# Patient Record
Sex: MALE | Race: WHITE | Employment: FULL TIME | URBAN - METROPOLITAN AREA
[De-identification: names, ages, dates, MRNs, and addresses within clinical notes are randomized per-mention and may not be internally consistent; named-entity substitution may affect disease eponyms.]

---

## 2017-08-04 ENCOUNTER — OFFICE VISIT (OUTPATIENT)
Dept: FAMILY MEDICINE CLINIC | Age: 57
End: 2017-08-04
Payer: COMMERCIAL

## 2017-08-04 VITALS
HEIGHT: 69 IN | HEART RATE: 77 BPM | DIASTOLIC BLOOD PRESSURE: 80 MMHG | WEIGHT: 179.2 LBS | SYSTOLIC BLOOD PRESSURE: 152 MMHG | BODY MASS INDEX: 26.54 KG/M2 | OXYGEN SATURATION: 97 %

## 2017-08-04 DIAGNOSIS — Z12.11 SCREEN FOR COLON CANCER: ICD-10-CM

## 2017-08-04 DIAGNOSIS — K42.9 UMBILICAL HERNIA WITHOUT OBSTRUCTION AND WITHOUT GANGRENE: ICD-10-CM

## 2017-08-04 DIAGNOSIS — Z51.81 MEDICATION MONITORING ENCOUNTER: ICD-10-CM

## 2017-08-04 DIAGNOSIS — Z12.5 SCREENING FOR PROSTATE CANCER: ICD-10-CM

## 2017-08-04 DIAGNOSIS — I83.892 VARICOSE VEINS OF LEFT LEG WITH EDEMA: ICD-10-CM

## 2017-08-04 DIAGNOSIS — Z00.01 ENCOUNTER FOR GENERAL ADULT MEDICAL EXAMINATION WITH ABNORMAL FINDINGS: Primary | ICD-10-CM

## 2017-08-04 DIAGNOSIS — F17.200 SMOKING: ICD-10-CM

## 2017-08-04 DIAGNOSIS — R03.0 ELEVATED BLOOD PRESSURE READING: ICD-10-CM

## 2017-08-04 DIAGNOSIS — Z13.220 SCREENING FOR LIPID DISORDERS: ICD-10-CM

## 2017-08-04 PROCEDURE — 99204 OFFICE O/P NEW MOD 45 MIN: CPT | Performed by: FAMILY MEDICINE

## 2017-08-04 ASSESSMENT — PATIENT HEALTH QUESTIONNAIRE - PHQ9
1. LITTLE INTEREST OR PLEASURE IN DOING THINGS: 0
SUM OF ALL RESPONSES TO PHQ QUESTIONS 1-9: 0
SUM OF ALL RESPONSES TO PHQ9 QUESTIONS 1 & 2: 0
2. FEELING DOWN, DEPRESSED OR HOPELESS: 0

## 2017-08-04 ASSESSMENT — ENCOUNTER SYMPTOMS
COUGH: 0
COLOR CHANGE: 0
EYE PAIN: 0
SINUS PRESSURE: 0
CHOKING: 0
DIARRHEA: 0
ABDOMINAL DISTENTION: 0
APNEA: 0
SHORTNESS OF BREATH: 0
EYE REDNESS: 0
WHEEZING: 0
ABDOMINAL PAIN: 0
EYE DISCHARGE: 0
STRIDOR: 0
EYE ITCHING: 0
RHINORRHEA: 0
NAUSEA: 0
CONSTIPATION: 0
VOMITING: 0
BACK PAIN: 0
CHEST TIGHTNESS: 0
SORE THROAT: 0
BLOOD IN STOOL: 0
PHOTOPHOBIA: 0

## 2017-08-22 LAB
ALBUMIN SERPL-MCNC: 4.6 G/DL
ALP BLD-CCNC: 100 U/L
ALT SERPL-CCNC: 17 U/L
ANION GAP SERPL CALCULATED.3IONS-SCNC: 12 MMOL/L
AST SERPL-CCNC: 18 U/L
BILIRUB SERPL-MCNC: 1.2 MG/DL (ref 0.1–1.4)
BUN BLDV-MCNC: 15 MG/DL
CALCIUM SERPL-MCNC: 9.1 MG/DL
CHLORIDE BLD-SCNC: 104 MMOL/L
CHOLESTEROL, TOTAL: 193 MG/DL
CHOLESTEROL/HDL RATIO: 3.8
CO2: 26 MMOL/L
CREAT SERPL-MCNC: 0.86 MG/DL
GFR CALCULATED: >60
GLUCOSE BLD-MCNC: 87 MG/DL
HDLC SERPL-MCNC: 51 MG/DL (ref 35–70)
LDL CHOLESTEROL CALCULATED: 106 MG/DL (ref 0–160)
POTASSIUM SERPL-SCNC: 4.2 MMOL/L
PROSTATE SPECIFIC ANTIGEN: 0.77 NG/ML
SODIUM BLD-SCNC: 142 MMOL/L
TOTAL PROTEIN: 7.5
TRIGL SERPL-MCNC: 178 MG/DL
VLDLC SERPL CALC-MCNC: 36 MG/DL

## 2017-08-24 DIAGNOSIS — Z00.01 ENCOUNTER FOR GENERAL ADULT MEDICAL EXAMINATION WITH ABNORMAL FINDINGS: ICD-10-CM

## 2017-08-24 DIAGNOSIS — Z51.81 MEDICATION MONITORING ENCOUNTER: ICD-10-CM

## 2017-08-24 DIAGNOSIS — Z12.5 SCREENING FOR PROSTATE CANCER: ICD-10-CM

## 2017-08-24 DIAGNOSIS — Z13.220 SCREENING FOR LIPID DISORDERS: ICD-10-CM

## 2017-09-11 DIAGNOSIS — Z12.11 SCREEN FOR COLON CANCER: ICD-10-CM

## 2017-09-11 DIAGNOSIS — Z00.01 ENCOUNTER FOR GENERAL ADULT MEDICAL EXAMINATION WITH ABNORMAL FINDINGS: ICD-10-CM

## 2017-09-11 LAB
CONTROL: PRESENT
HEMOCCULT STL QL: ABNORMAL

## 2017-09-11 PROCEDURE — 82274 ASSAY TEST FOR BLOOD FECAL: CPT | Performed by: FAMILY MEDICINE

## 2017-11-10 DIAGNOSIS — R19.5 POSITIVE FIT (FECAL IMMUNOCHEMICAL TEST): Primary | ICD-10-CM

## 2018-03-16 ENCOUNTER — OFFICE VISIT (OUTPATIENT)
Dept: FAMILY MEDICINE CLINIC | Age: 58
End: 2018-03-16
Payer: COMMERCIAL

## 2018-03-16 VITALS
OXYGEN SATURATION: 97 % | SYSTOLIC BLOOD PRESSURE: 124 MMHG | HEIGHT: 69 IN | BODY MASS INDEX: 26.35 KG/M2 | DIASTOLIC BLOOD PRESSURE: 78 MMHG | WEIGHT: 177.9 LBS | HEART RATE: 98 BPM

## 2018-03-16 DIAGNOSIS — N40.2 PROSTATE NODULE: Primary | ICD-10-CM

## 2018-03-16 PROCEDURE — 99214 OFFICE O/P EST MOD 30 MIN: CPT | Performed by: FAMILY MEDICINE

## 2018-03-16 RX ORDER — TERBINAFINE HYDROCHLORIDE 250 MG/1
250 TABLET ORAL DAILY
Qty: 30 TABLET | Refills: 2 | Status: SHIPPED | OUTPATIENT
Start: 2018-03-16 | End: 2018-07-31 | Stop reason: SDUPTHER

## 2018-03-16 ASSESSMENT — ENCOUNTER SYMPTOMS
VOMITING: 0
ABDOMINAL PAIN: 0
COLOR CHANGE: 0
CHOKING: 0
APNEA: 0
SORE THROAT: 0
CONSTIPATION: 0
RHINORRHEA: 0
NAUSEA: 0
EYE DISCHARGE: 0
SHORTNESS OF BREATH: 0
EYE ITCHING: 0
BACK PAIN: 0
SINUS PRESSURE: 0
ABDOMINAL DISTENTION: 0
EYE REDNESS: 0
WHEEZING: 0
CHEST TIGHTNESS: 0
COUGH: 0
BLOOD IN STOOL: 0
EYE PAIN: 0
STRIDOR: 0
DIARRHEA: 0
PHOTOPHOBIA: 0

## 2018-03-16 NOTE — PROGRESS NOTES
Subjective:      Patient ID: Rasta Sylvester is a 60 y.o. male. Visit Information    Have you changed or started any medications since your last visit including any over-the-counter medicines, vitamins, or herbal medicines? no   Are you having any side effects from any of your medications? -  no  Have you stopped taking any of your medications? Is so, why? -  no    Have you seen any other physician or provider since your last visit? No  Have you had any other diagnostic tests since your last visit? No  Have you been seen in the emergency room and/or had an admission to a hospital since we last saw you? No  Have you had your routine dental cleaning in the past 6 months? yes -     Have you activated your [employer] account? If not, what are your barriers?  No:      Patient Care Team:  Arianna Osborn MD as PCP - General (Family Medicine)    Medical History Review  Past Medical, Family, and Social History reviewed and  contribute to the patient presenting condition    Health Maintenance   Topic Date Due    Hepatitis C screen  1960    HIV screen  01/17/1974    DTaP/Tdap/Td vaccine (1 - Tdap) 01/17/1978    Pneumococcal med risk (1 of 1 - PPSV23) 01/17/1978    Shingles Vaccine (1 of 2 - 2 Dose Series) 01/17/2009    Smoker: low dose lung CT screening  01/17/2014    Flu vaccine (1) 09/01/2017    Colon Cancer Screen FIT/FOBT  09/09/2018    Lipid screen  08/22/2022       HPI    Review of Systems    Objective:   Physical Exam    Assessment / Plan:

## 2018-04-04 DIAGNOSIS — N40.2 PROSTATE NODULE: ICD-10-CM

## 2018-07-12 ENCOUNTER — OFFICE VISIT (OUTPATIENT)
Dept: FAMILY MEDICINE CLINIC | Age: 58
End: 2018-07-12
Payer: COMMERCIAL

## 2018-07-12 VITALS
TEMPERATURE: 98 F | WEIGHT: 178 LBS | SYSTOLIC BLOOD PRESSURE: 130 MMHG | RESPIRATION RATE: 16 BRPM | OXYGEN SATURATION: 95 % | HEART RATE: 92 BPM | BODY MASS INDEX: 26.29 KG/M2 | DIASTOLIC BLOOD PRESSURE: 62 MMHG

## 2018-07-12 DIAGNOSIS — Z23 ENCOUNTER FOR VACCINATION: ICD-10-CM

## 2018-07-12 DIAGNOSIS — Z09 HOSPITAL DISCHARGE FOLLOW-UP: ICD-10-CM

## 2018-07-12 DIAGNOSIS — L03.116 CELLULITIS OF LEFT LOWER EXTREMITY: Primary | ICD-10-CM

## 2018-07-12 PROCEDURE — 99214 OFFICE O/P EST MOD 30 MIN: CPT | Performed by: NURSE PRACTITIONER

## 2018-07-12 PROCEDURE — 90715 TDAP VACCINE 7 YRS/> IM: CPT | Performed by: NURSE PRACTITIONER

## 2018-07-12 PROCEDURE — 90471 IMMUNIZATION ADMIN: CPT | Performed by: NURSE PRACTITIONER

## 2018-07-12 PROCEDURE — 90472 IMMUNIZATION ADMIN EACH ADD: CPT | Performed by: NURSE PRACTITIONER

## 2018-07-12 PROCEDURE — 90732 PPSV23 VACC 2 YRS+ SUBQ/IM: CPT | Performed by: NURSE PRACTITIONER

## 2018-07-12 RX ORDER — CEPHALEXIN 500 MG/1
500 CAPSULE ORAL
COMMUNITY
Start: 2018-07-08 | End: 2018-07-18

## 2018-07-12 RX ORDER — SULFAMETHOXAZOLE AND TRIMETHOPRIM 800; 160 MG/1; MG/1
1 TABLET ORAL
COMMUNITY
Start: 2018-07-08 | End: 2018-07-15

## 2018-07-12 ASSESSMENT — ENCOUNTER SYMPTOMS
VOMITING: 0
ALLERGIC/IMMUNOLOGIC NEGATIVE: 1
DIARRHEA: 0
EYES NEGATIVE: 1
COLOR CHANGE: 1
RESPIRATORY NEGATIVE: 1
GASTROINTESTINAL NEGATIVE: 1
NAUSEA: 0

## 2018-07-12 NOTE — PROGRESS NOTES
Smoking status: Current Every Day Smoker     Packs/day: 1.50     Years: 30.00     Types: Cigarettes    Smokeless tobacco: Never Used    Alcohol use Yes      Current Outpatient Prescriptions   Medication Sig Dispense Refill    cephALEXin (KEFLEX) 500 MG capsule Take 500 mg by mouth      sulfamethoxazole-trimethoprim (BACTRIM DS;SEPTRA DS) 800-160 MG per tablet Take 1 tablet by mouth       No current facility-administered medications for this visit. No Known Allergies    HPI:     HPI     Presents today for hospital follow-up. Was at [facility] ER on Sunday and dx with LLE cellulitis. Lab work for DVT (d-dimer) and CBC (no white count)/BMP was all WNL. Was placed on Bactrim and Keflex s/p visit. Pt started both antibiotics on Monday. Has noticed some improvement. Declines associated fevers. Occasional throbbing pain, swelling has improved. Declines increased redness. No constant calf pain other than occ. Juan hoarse. Otherwise feeling well. Subjective:      Review of Systems   Constitutional: Negative. Negative for activity change, appetite change, chills, fever and unexpected weight change. HENT: Negative. Eyes: Negative. Respiratory: Negative. Cardiovascular: Negative. Gastrointestinal: Negative. Negative for diarrhea, nausea and vomiting. Endocrine: Negative. Genitourinary: Negative. Musculoskeletal: Negative. Skin: Positive for color change (redness L ankle). Negative for pallor, rash and wound. Allergic/Immunologic: Negative. Neurological: Negative. Hematological: Negative. Objective:     Vitals:    07/12/18 0845   BP: 130/62   Pulse: 92   Resp: 16   Temp: 98 °F (36.7 °C)   SpO2: 95%       Body mass index is 26.29 kg/m². Physical Exam   Constitutional: He is oriented to person, place, and time. He appears well-developed and well-nourished. No distress. HENT:   Head: Normocephalic and atraumatic.    Right Ear: External ear normal.   Left Ear: External ear

## 2018-07-12 NOTE — PATIENT INSTRUCTIONS
disease. This vaccine will not treat an active infection that has already developed in the body. Like any vaccine, the Tdap vaccine may not provide protection from disease in every person. What should I discuss with my healthcare provider before receiving this vaccine? You should not receive this vaccine if:  · you had a life-threatening allergic reaction to any vaccine that contains tetanus, diphtheria, or pertussis; or  · you had a neurologic disorder affecting your brain (such as loss of consciousness or a prolonged seizure) within 7 days after having a previous pertussis vaccine. You may not be able to receive a Tdap vaccine if you have ever received a similar vaccine that caused any of the following:  · a very high fever (over 104 degrees Fahrenheit);  · a neurologic disorder or disease affecting the brain;  · fainting or going into shock;  · severe pain, redness, tenderness, swelling, or a lump where the shot was given;  · an allergy to latex rubber;  · severe or uncontrolled epilepsy or other seizure disorder; or  · Guillain-Barré syndrome (within 6 weeks after receiving a vaccine containing tetanus). If you have any of these other conditions, your vaccine may need to be postponed or not given at all:  · a history of seizures;  · a weak immune system caused by disease, bone marrow transplant, or by using certain medicines or receiving cancer treatments; or  · if it has been less than 10 years since you last received a tetanus shot. You can still receive a vaccine if you have a minor cold. In the case of a more severe illness with a fever or any type of infection, wait until you get better before receiving this vaccine. It is not known whether Tdap vaccine will harm an unborn baby. However, you may need a Tdap vaccine during pregnancy to protect your  baby from pertussis. [address] [place] [address] babies are most at risk for severe, life-threatening complications from pertussis.  Your doctor should determine whether they turned 72. Your healthcare provider can give you more information about these recommendations. Most healthy adults develop protection within 2 to 3 weeks of getting the shot. Some people should not get this vaccine  · Anyone who has had a life-threatening allergic reaction to PPSV should not get another dose. · Anyone who has a severe allergy to any component of PPSV should not receive it. Tell your provider if you have any severe allergies. · Anyone who is moderately or severely ill when the shot is scheduled may be asked to wait until they recover before getting the vaccine. Someone with a mild illness can usually be vaccinated. · Children less than 3years of age should not receive this vaccine. · There is no evidence that PPSV is harmful to either a pregnant woman or to her fetus. However, as a precaution, women who need the vaccine should be vaccinated before becoming pregnant, if possible. Risks of a vaccine reaction  With any medicine, including vaccines, there is a chance of side effects. These are usually mild and go away on their own, but serious reactions are also possible. About half of people who get PPSV have mild side effects, such as redness or pain where the shot is given, which go away within about two days. Less than 1 out of 100 people develop a fever, muscle aches, or more severe local reactions. Problems that could happen after any vaccine:  · People sometimes faint after a medical procedure, including vaccination. Sitting or lying down for about 15 minutes can help prevent fainting, and injuries caused by a fall. Tell your doctor if you feel dizzy, or have vision changes or ringing in the ears. · Some people get severe pain in the shoulder and have difficulty moving the arm where a shot was given. This happens very rarely. · Any medication can cause a severe allergic reaction.  Such reactions from a vaccine are very rare, estimated at about 1 in a million doses, and would happen within a few minutes to a few hours after the vaccination. As with any medicine, there is a very remote chance of a vaccine causing a serious injury or death. The safety of vaccines is always being monitored. For more information, visit: www.cdc.gov/vaccinesafety/  What if there is a serious reaction? What should I look for? Look for anything that concerns you, such as signs of a severe allergic reaction, very high fever, or unusual behavior. Signs of a severe allergic reaction can include hives, swelling of the face and throat, difficulty breathing, a fast heartbeat, dizziness, and weakness. These would usually start a few minutes to a few hours after the vaccination. What should I do? If you think it is a severe allergic reaction or other emergency that can't wait, call 9-1-1 or get to the nearest hospital. Otherwise, call your doctor. Afterward, the reaction should be reported to the Vaccine Adverse Event Reporting System (VAERS). Your doctor might file this report, or you can do it yourself through the VAERS web site at www.vaers. [facility].gov, or by calling 3-532.309.1247. VAERS does not give medical advice. How can I learn more? · Ask your doctor. He or she can give you the vaccine package insert or suggest other sources of information. · Call your local or state health department. · Contact the Centers for Disease Control and Prevention (CDC):  ¨ Call 5-908.314.7272 (1-800-CDC-INFO) or  ¨ Visit CDC's website at www.cdc.gov/vaccines  Vaccine Information Statement  PPSV Vaccine  (04/24/2015)  Department of Health and Human Services  Centers for Disease Control and Prevention  Many Vaccine Information Statements are available in Mongolian and other languages. See www.immunize.org/vis. Hojas de información Sobre Vacunas están disponibles en español y en muchos otros idiomas. Visite Opal.si. Care instructions adapted under license by [facility] ([facility]).  If you have questions about a

## 2018-07-17 ENCOUNTER — TELEPHONE (OUTPATIENT)
Dept: FAMILY MEDICINE CLINIC | Age: 58
End: 2018-07-17

## 2018-07-20 ENCOUNTER — OFFICE VISIT (OUTPATIENT)
Dept: FAMILY MEDICINE CLINIC | Age: 58
End: 2018-07-20
Payer: COMMERCIAL

## 2018-07-20 ENCOUNTER — TELEPHONE (OUTPATIENT)
Dept: FAMILY MEDICINE CLINIC | Age: 58
End: 2018-07-20

## 2018-07-20 VITALS
RESPIRATION RATE: 16 BRPM | DIASTOLIC BLOOD PRESSURE: 78 MMHG | SYSTOLIC BLOOD PRESSURE: 132 MMHG | WEIGHT: 178 LBS | HEART RATE: 72 BPM | BODY MASS INDEX: 26.29 KG/M2 | OXYGEN SATURATION: 96 %

## 2018-07-20 DIAGNOSIS — Z09 FOLLOW UP: ICD-10-CM

## 2018-07-20 DIAGNOSIS — I73.9 PVD (PERIPHERAL VASCULAR DISEASE) (HCC): ICD-10-CM

## 2018-07-20 DIAGNOSIS — M79.89 SWELLING OF LEFT LOWER EXTREMITY: Primary | ICD-10-CM

## 2018-07-20 PROCEDURE — 99214 OFFICE O/P EST MOD 30 MIN: CPT | Performed by: NURSE PRACTITIONER

## 2018-07-20 ASSESSMENT — ENCOUNTER SYMPTOMS
SHORTNESS OF BREATH: 0
GASTROINTESTINAL NEGATIVE: 1
RESPIRATORY NEGATIVE: 1
EYES NEGATIVE: 1
ALLERGIC/IMMUNOLOGIC NEGATIVE: 1
COLOR CHANGE: 1

## 2018-07-25 DIAGNOSIS — M79.89 SWELLING OF LEFT LOWER EXTREMITY: ICD-10-CM

## 2018-07-31 RX ORDER — TERBINAFINE HYDROCHLORIDE 250 MG/1
TABLET ORAL
Qty: 30 TABLET | Refills: 2 | Status: SHIPPED | OUTPATIENT
Start: 2018-07-31 | End: 2019-04-26 | Stop reason: ALTCHOICE

## 2018-07-31 NOTE — TELEPHONE ENCOUNTER
LV - 7/20/18    Next Visit Date:  No future appointments.     Health Maintenance   Topic Date Due    Hepatitis C screen  1960    HIV screen  01/17/1974    Low dose CT lung screening  01/17/2014    Shingles Vaccine (1 of 2 - 2 Dose Series) 08/01/2018 (Originally 1/17/2009)    Flu vaccine (1) 09/01/2018    Colon Cancer Screen FIT/FOBT  09/09/2018    Lipid screen  08/22/2022    DTaP/Tdap/Td vaccine (2 - Td) 07/12/2028    Pneumococcal med risk  Completed       No results found for: LABA1C          ( goal A1C is < 7)   No results found for: LABMICR  LDL Calculated (mg/dL)   Date Value   08/22/2017 106       (goal LDL is <100)   AST (U/L)   Date Value   08/22/2017 18     ALT (U/L)   Date Value   08/22/2017 17     BUN (mg/dL)   Date Value   08/22/2017 15     BP Readings from Last 3 Encounters:   07/20/18 132/78   07/12/18 130/62   03/16/18 124/78          (goal 120/80)    All Future Testing planned in [facility]              Patient Active Problem List:     Varicose veins of left leg with edema     Umbilical hernia without obstruction and without gangrene     Smoking     Elevated blood pressure reading

## 2019-04-26 ENCOUNTER — OFFICE VISIT (OUTPATIENT)
Dept: FAMILY MEDICINE CLINIC | Age: 59
End: 2019-04-26
Payer: COMMERCIAL

## 2019-04-26 VITALS
OXYGEN SATURATION: 98 % | WEIGHT: 164 LBS | TEMPERATURE: 97.6 F | BODY MASS INDEX: 24.29 KG/M2 | SYSTOLIC BLOOD PRESSURE: 138 MMHG | DIASTOLIC BLOOD PRESSURE: 84 MMHG | HEIGHT: 69 IN | HEART RATE: 84 BPM

## 2019-04-26 DIAGNOSIS — B35.1 ONYCHOMYCOSIS: ICD-10-CM

## 2019-04-26 DIAGNOSIS — L03.116 CELLULITIS OF LEFT LOWER LEG: Primary | ICD-10-CM

## 2019-04-26 PROCEDURE — 99213 OFFICE O/P EST LOW 20 MIN: CPT | Performed by: NURSE PRACTITIONER

## 2019-04-26 RX ORDER — SULFAMETHOXAZOLE AND TRIMETHOPRIM 800; 160 MG/1; MG/1
1 TABLET ORAL 2 TIMES DAILY
Qty: 20 TABLET | Refills: 0 | Status: SHIPPED | OUTPATIENT
Start: 2019-04-26 | End: 2019-05-06

## 2019-04-26 ASSESSMENT — PATIENT HEALTH QUESTIONNAIRE - PHQ9
SUM OF ALL RESPONSES TO PHQ QUESTIONS 1-9: 0
1. LITTLE INTEREST OR PLEASURE IN DOING THINGS: 0
SUM OF ALL RESPONSES TO PHQ QUESTIONS 1-9: 0
SUM OF ALL RESPONSES TO PHQ9 QUESTIONS 1 & 2: 0
2. FEELING DOWN, DEPRESSED OR HOPELESS: 0

## 2019-04-26 NOTE — PROGRESS NOTES
Visit Information    Have you changed or started any medications since your last visit including any over-the-counter medicines, vitamins, or herbal medicines? no   Are you having any side effects from any of your medications? -  no  Have you stopped taking any of your medications? Is so, why? -  no    Have you seen any other physician or provider since your last visit? No  Have you had any other diagnostic tests since your last visit? No  Have you been seen in the emergency room and/or had an admission to a hospital since we last saw you? No  Have you had your routine dental cleaning in the past 6 months? yes -     Have you activated your [employer] account? If not, what are your barriers?  No:      Patient Care Team:  Wei Sorto MD as PCP - General (Family Medicine)    Medical History Review  Past Medical, Family, and Social History reviewed and does not contribute to the patient presenting condition    Health Maintenance   Topic Date Due    Hepatitis C screen  1960    HIV screen  01/17/1974    Shingles Vaccine (1 of 2) 01/17/2009    Low dose CT lung screening  01/17/2014    Flu vaccine (Season Ended) 09/01/2019    Colon cancer screen colonoscopy  02/12/2021    Lipid screen  08/22/2022    DTaP/Tdap/Td vaccine (2 - Td) 07/12/2028    Pneumococcal 0-64 years Vaccine  Completed

## 2019-04-26 NOTE — PROGRESS NOTES
[facility], FNP-C  [address]  [id number] [place]. [facility], [facility]  [phone]  [phone]    Erma Nuñez is a 60 y.o. male who is here with c/o of:    Chief Complaint: Leg Pain (left and started 2 weeks ago )      Patient Accompanied by: patient    HPI - Erma Nuñez is here today with c/o:    Patient is here with c/o left lower leg swelling, pain, erythema. He reports his symptoms started about 2 weeks ago. Patient reports he has a hx of cellulitis to that leg and has required antibiotics for this problem in the past. He denies fever. He has taken Aleve for his discomfort and this has not helped. Patient also has c/o several foot issues including toe nail fungus and bunions. He reports being treated with oral medication in the past for the toe nail fungus and this has not helped. He is requesting medication and treatment options for both problems. Patient Active Problem List:     Varicose veins of left leg with edema     Umbilical hernia without obstruction and without gangrene     Smoking     Elevated blood pressure reading     Past Medical History:   Diagnosis Date    Osteoarthritis       Past Surgical History:   Procedure Laterality Date    FOOT SURGERY      TONSILLECTOMY       Family History   Problem Relation Age of Onset    Cancer Mother      Social History     Tobacco Use    Smoking status: Current Every Day Smoker     Packs/day: 1.50     Years: 30.00     Pack years: 45.00     Types: Cigarettes    Smokeless tobacco: Never Used   Substance Use Topics    Alcohol use: Yes     ALLERGIES:  No Known Allergies       Subjective     · Constitutional:  Negative for activity change, appetite change,unexpected weight change, chills, fever, and fatigue. · HENT: Negative for ear pain, sore throat,  Rhinorrhea, sinus pain, sinus pressure, congestion. · Eyes:  Negative for pain and discharge.    · Respiratory:  Negative for chest tightness, shortness of breath, wheezing, and cough. · Cardiovascular:  Negative for chest pain, palpitations and leg swelling. · Gastrointestinal: Negative for abdominal pain, blood in stool, constipation,diarrhea, nausea and vomiting. · Endocrine: Negative for cold intolerance, heat intolerance, polydipsia, polyphagia and polyuria. · Genitourinary: Negative for difficulty urinating, dysuria, flank pain, frequency, hematuria and urgency. · Musculoskeletal: Negative for arthralgias, back pain, joint swelling, myalgias, neck pain and neck stiffness. · Skin: Negative for rash and wound. Positive for left lower leg erythema, swelling, bilateral toe fungus  · Allergic/Immunologic: Negative for environmental allergies and food allergies. · Neurological:  Negative for dizziness, light-headedness, numbness and headaches. · Hematological:  Negative for adenopathy. Does not bruise/bleed easily. · Psychiatric/Behavioral: Negative for self-injury, sleep disturbance and suicidal ideas. Objective     PHYSICAL EXAM:   · Constitutional: Ángel Celeste is oriented to person, place, and time. Vital signs are normal. Appears well-developed and well-nourished. · HEENT:   · Head: Normocephalic and atraumatic. Right Ear: Hearing and external ear normal.     · Left Ear: Hearing and external ear normal.    · Nose: Nares normal.   · Eyes:PERRL, EOMI, Conjunctiva normal, No discharge. · Neck: Full passive range of motion. Cardiovascular: Normal rate, regular rhythm, S1, S2, no murmur, no gallop, no friction rub, intact distal pulses. · Pulmonary/Chest: Breath sounds are clear throughout, No respiratory distress, No wheezing, No chest tenderness. Effort normal  · Lymphadenopathy: No lymphadenopathy noted. · Neurological: Alert and oriented to person, place, and time. Normal motor function, Normal sensory function, No focal deficits noted. He has normal strength. · Skin: Skin is warm, dry and intact. No obvious lesions on exposed skin. Left lower leg - anterior/medial erythema, moderate sized vesicles, warm to touch, and very tender. Bilateral toe nails are thick and yellow, cracking  · Psychiatric: Normal mood and affect. Speech is normal and behavior is normal.       Nursing note and vitals reviewed. Blood pressure 138/84, pulse 84, temperature 97.6 °F (36.4 °C), temperature source Temporal, height 5' 9\" (1.753 m), weight 164 lb (74.4 kg), SpO2 98 %. Body mass index is 24.22 kg/m². Wt Readings from Last 3 Encounters:   04/26/19 164 lb (74.4 kg)   07/20/18 178 lb (80.7 kg)   07/12/18 178 lb (80.7 kg)     BP Readings from Last 3 Encounters:   04/26/19 138/84   07/20/18 132/78   07/12/18 130/62       No results found for this visit on 04/26/19. Completed Orders/Prescriptions   Orders Placed This Encounter   Medications    sulfamethoxazole-trimethoprim (BACTRIM DS;SEPTRA DS) 800-160 MG per tablet     Sig: Take 1 tablet by mouth 2 times daily for 10 days     Dispense:  20 tablet     Refill:  0               AssessmentPlan/Medical Decision Making     1. Cellulitis of left lower leg  - photo's in media  - sulfamethoxazole-trimethoprim (BACTRIM DS;SEPTRA DS) 800-160 MG per tablet; Take 1 tablet by mouth 2 times daily for 10 days  Dispense: 20 tablet; Refill: 0    2. Onychomycosis  - Mikayla Delgado DPM, Podiatry, [facility]      Return in about 2 weeks (around 5/10/2019), or if symptoms worsen or fail to improve, for left lower leg cellulitis. 1.  Ulices Hogan received counseling on the following healthy behaviors: nutrition, exercise and medication adherence  2. Patient given educational materials - see patient instructions  3. Was a self-tracking handout given in paper form or via [employer]? No  If yes, see orders or list here. 4.  Discussed use, benefit, and side effects of prescribed medications. Barriers to medication compliance addressed. All patient questions answered. Pt voiced understanding.    5.  Reviewed prior labs, imaging, consultation, follow up, and health maintenance  6. Continue current medications, diet and exercise. 7. Discussed use, benefit, and side effects of prescribed medications. Barriers to medication compliance addressed. All her questions were answered. Pt voiced understanding. Raffy Reese will continue current medications, diet and exercise. Patient given educational materials on cellulitis    Of the 25 minute duration appointment visit, Herman Murray CNP spent at least 50% of the face-to-face time in counseling, explanation of diagnosis, planning of further management, and answering all questions. Signed:  Herman Murray CNP    This note is created with the assistance of a speech-recognition program.  While intending to generate a document that actually reflects the content of the visit, no guarantees can be provided that every mistake has been identified and corrected by editing.

## 2019-05-01 ENCOUNTER — TELEPHONE (OUTPATIENT)
Dept: OTHER | Age: 59
End: 2019-05-01

## 2019-05-10 ENCOUNTER — OFFICE VISIT (OUTPATIENT)
Dept: FAMILY MEDICINE CLINIC | Age: 59
End: 2019-05-10
Payer: COMMERCIAL

## 2019-05-10 VITALS
TEMPERATURE: 98.7 F | HEART RATE: 90 BPM | BODY MASS INDEX: 24.51 KG/M2 | OXYGEN SATURATION: 96 % | SYSTOLIC BLOOD PRESSURE: 134 MMHG | DIASTOLIC BLOOD PRESSURE: 82 MMHG | WEIGHT: 166 LBS

## 2019-05-10 DIAGNOSIS — L03.116 CELLULITIS OF LEFT LOWER LEG: Primary | ICD-10-CM

## 2019-05-10 PROCEDURE — 99213 OFFICE O/P EST LOW 20 MIN: CPT | Performed by: NURSE PRACTITIONER

## 2019-05-10 RX ORDER — SULFAMETHOXAZOLE AND TRIMETHOPRIM 800; 160 MG/1; MG/1
1 TABLET ORAL 2 TIMES DAILY
Qty: 20 TABLET | Refills: 0 | Status: SHIPPED | OUTPATIENT
Start: 2019-05-10 | End: 2019-05-20

## 2019-05-10 NOTE — PROGRESS NOTES
Deepa Pickett, P-C  [address]  [address] [facility]. [place]   [address]  [phone]  [phone]    Radha Garduno is a 60 y.o. male who is here with c/o of:    Chief Complaint: 2 Week Follow-Up (left leg cellultis )      Patient Accompanied by: patient    HPI - Radha Garduno is here today with c/o:    Patient is here for f/u of left lower leg cellulitis. Patient reports continued swelling and redness. However, reports improved. He completed antibiotics on Monday as prescribed. Patient Active Problem List:     Varicose veins of left leg with edema     Umbilical hernia without obstruction and without gangrene     Smoking     Elevated blood pressure reading     Past Medical History:   Diagnosis Date    Osteoarthritis       Past Surgical History:   Procedure Laterality Date    FOOT SURGERY      TONSILLECTOMY       Family History   Problem Relation Age of Onset    Cancer Mother      Social History     Tobacco Use    Smoking status: Current Every Day Smoker     Packs/day: 1.50     Years: 30.00     Pack years: 45.00     Types: Cigarettes    Smokeless tobacco: Never Used   Substance Use Topics    Alcohol use: Yes     ALLERGIES:  No Known Allergies       Subjective     · Constitutional:  Negative for activity change, appetite change,unexpected weight change, chills, fever, and fatigue. · HENT: Negative for ear pain, sore throat,  Rhinorrhea, sinus pain, sinus pressure, congestion. · Eyes:  Negative for pain and discharge. · Respiratory:  Negative for chest tightness, shortness of breath, wheezing, and cough. · Cardiovascular:  Negative for chest pain, palpitations and leg swelling. · Gastrointestinal: Negative for abdominal pain, blood in stool, constipation,diarrhea, nausea and vomiting. · Endocrine: Negative for cold intolerance, heat intolerance, polydipsia, polyphagia and polyuria.    · Genitourinary: Negative for difficulty urinating, dysuria, flank pain, frequency, hematuria and urgency. · Musculoskeletal: Negative for arthralgias, back pain, joint swelling, myalgias, neck pain and neck stiffness. · Skin: Negative for rash and wound. Positive for left lower leg erythema and swelling, no ulcers  · Allergic/Immunologic: Negative for environmental allergies and food allergies. · Neurological:  Negative for dizziness, light-headedness, numbness and headaches. · Hematological:  Negative for adenopathy. Does not bruise/bleed easily. · Psychiatric/Behavioral: Negative for self-injury, sleep disturbance and suicidal ideas. Objective     PHYSICAL EXAM:   · Constitutional: Eliezer Jim is oriented to person, place, and time. Vital signs are normal. Appears well-developed and well-nourished. · HEENT:   · Head: Normocephalic and atraumatic. Right Ear: Hearing and external ear normal.     · Left Ear: Hearing and external ear normal.    · Nose: Nares normal.   · Eyes:PERRL, EOMI, Conjunctiva normal, No discharge. · Neck: Full passive range of motion. Non-tender on palpation. Neck supple. No thyromegaly present. Trachea normal.  · Cardiovascular: Normal rate, regular rhythm, S1, S2, no murmur, no gallop, no friction rub, intact distal pulses. · Pulmonary/Chest: Breath sounds are clear throughout, No respiratory distress, No wheezing, No chest tenderness. Effort normal  · Lymphadenopathy: No lymphadenopathy noted. · Neurological: Alert and oriented to person, place, and time. Normal motor function, Normal sensory function, No focal deficits noted. He has normal strength. · Skin: Skin is warm, dry and intact. No obvious lesions on exposed skin Left lower leg - moderate edema, erythema, no ulcers  · Psychiatric: Normal mood and affect. Speech is normal and behavior is normal.       Nursing note and vitals reviewed. Blood pressure 134/82, pulse 90, temperature 98.7 °F (37.1 °C), temperature source Temporal, weight 166 lb (75.3 kg), SpO2 96 %.   Body mass index duration appointment visit, Bartolo Yusuf CNP spent at least 50% of the face-to-face time in counseling, explanation of diagnosis, planning of further management, and answering all questions. Signed:  Bartolo Yusuf CNP    This note is created with the assistance of a speech-recognition program.  While intending to generate a document that actually reflects the content of the visit, no guarantees can be provided that every mistake has been identified and corrected by editing.

## 2019-05-10 NOTE — PROGRESS NOTES
Visit Information    Have you changed or started any medications since your last visit including any over-the-counter medicines, vitamins, or herbal medicines? no   Are you having any side effects from any of your medications? -  no  Have you stopped taking any of your medications? Is so, why? -  no    Have you seen any other physician or provider since your last visit? No  Have you had any other diagnostic tests since your last visit? No  Have you been seen in the emergency room and/or had an admission to a hospital since we last saw you? No  Have you had your routine dental cleaning in the past 6 months? no    Have you activated your [employer] account? If not, what are your barriers?  No:      Patient Care Team:  Khurram Funk MD as PCP - General (Family Medicine)    Medical History Review  Past Medical, Family, and Social History reviewed and does not contribute to the patient presenting condition    Health Maintenance   Topic Date Due    Hepatitis C screen  1960    HIV screen  01/17/1974    Shingles Vaccine (1 of 2) 01/17/2009    Low dose CT lung screening  01/17/2014    Flu vaccine (Season Ended) 09/01/2019    Colon cancer screen colonoscopy  02/12/2021    Lipid screen  08/22/2022    DTaP/Tdap/Td vaccine (2 - Td) 07/12/2028    Pneumococcal 0-64 years Vaccine  Completed

## 2019-06-03 ENCOUNTER — TELEPHONE (OUTPATIENT)
Dept: FAMILY MEDICINE CLINIC | Age: 59
End: 2019-06-03

## 2019-06-12 ENCOUNTER — TELEPHONE (OUTPATIENT)
Dept: FAMILY MEDICINE CLINIC | Age: 59
End: 2019-06-12

## 2019-06-12 NOTE — TELEPHONE ENCOUNTER
Please set up referral to Dr. Emelyn Robb at the center for vein solutions. Diagnosis: chronic venous stasis, varicose veins.

## 2019-06-12 NOTE — TELEPHONE ENCOUNTER
pts wife is calling for a referral to be sent to a vascular dr for the pts left leg and ankle swelling.  hes also having oainful veins in leg and ankle as wel

## 2019-06-13 DIAGNOSIS — I83.93 VARICOSE VEINS OF BOTH LOWER EXTREMITIES, UNSPECIFIED WHETHER COMPLICATED: ICD-10-CM

## 2019-06-13 DIAGNOSIS — I87.8 CHRONIC VENOUS STASIS: Primary | ICD-10-CM

## 2019-06-19 ENCOUNTER — OFFICE VISIT (OUTPATIENT)
Dept: PODIATRY | Age: 59
End: 2019-06-19
Payer: COMMERCIAL

## 2019-06-19 VITALS — RESPIRATION RATE: 16 BRPM | BODY MASS INDEX: 23.24 KG/M2 | HEIGHT: 71 IN | WEIGHT: 166 LBS

## 2019-06-19 DIAGNOSIS — M20.42 HAMMER TOES OF BOTH FEET: ICD-10-CM

## 2019-06-19 DIAGNOSIS — D23.72 BENIGN NEOPLASM OF SKIN OF LEFT FOOT: Primary | ICD-10-CM

## 2019-06-19 DIAGNOSIS — M79.605 PAIN IN BOTH LOWER EXTREMITIES: ICD-10-CM

## 2019-06-19 DIAGNOSIS — M20.41 HAMMER TOES OF BOTH FEET: ICD-10-CM

## 2019-06-19 DIAGNOSIS — M79.604 PAIN IN BOTH LOWER EXTREMITIES: ICD-10-CM

## 2019-06-19 DIAGNOSIS — R26.2 DIFFICULTY WALKING: ICD-10-CM

## 2019-06-19 PROBLEM — R19.5 POSITIVE FIT (FECAL IMMUNOCHEMICAL TEST): Status: ACTIVE | Noted: 2018-02-09

## 2019-06-19 PROCEDURE — 17110 DESTRUCTION B9 LES UP TO 14: CPT | Performed by: PODIATRIST

## 2019-06-19 PROCEDURE — 99203 OFFICE O/P NEW LOW 30 MIN: CPT | Performed by: PODIATRIST

## 2019-06-19 NOTE — PROGRESS NOTES
[facility]YSIC[facility]ANS  [facility] [address]  [address] [place] [address]  Dept: [phone]  Dept Fax: [phone]    NEW PATIENT PROGRESS NOTE  Date of patient's visit: 6/19/2019  Patient's Name:  Lesia Zuñiga YOB: 1960            Patient Care Team:  Yann Ho MD as PCP - General (Family Medicine)  Yann Ho MD as PCP - [facility]  Santi Ortiz DPM as Physician (Podiatry)        Chief Complaint   Patient presents with    New Patient    Benign Neoplasm     bilateral         HPI:   Lesia Zuñiga is a 60 y.o. male who presents to the office today complaining of bilateral feet lesions. Symptoms beganyear(s) ago. Patient relates pain is Present. Pain is rated 10 out of 10 and is described as intermittent. Treatments prior to today's visit include: no previous  Treatment for this. Currently denies F/C/N/V. Pt's primary care physician is Yann Ho MD last seen 5/10/19    No Known Allergies    Past Medical History:   Diagnosis Date    Osteoarthritis        Prior to Admission medications    Not on File       Past Surgical History:   Procedure Laterality Date    FOOT SURGERY      TONSILLECTOMY         Family History   Problem Relation Age of Onset    Cancer Mother        Social History     Tobacco Use    Smoking status: Current Every Day Smoker     Packs/day: 1.50     Years: 30.00     Pack years: 45.00     Types: Cigarettes    Smokeless tobacco: Never Used   Substance Use Topics    Alcohol use: Yes       Review of Systems    Review of Systems:   History obtained from chart review and the patient  General ROS: negative for - chills, fatigue, fever, night sweats or weight gain  Constitutional: Negative for chills, diaphoresis, fatigue, fever and unexpected weight change. Musculoskeletal: Positive for arthralgias, gait problem and joint swelling.   Neurological ROS: negative for - behavioral changes, confusion, headaches or seizures. Negative for weakness and numbness. Dermatological ROS: negative for - mole changes, rash  Cardiovascular: Negative for leg swelling. Gastrointestinal: Negative for constipation, diarrhea, nausea and vomiting. Lower Extremity Physical Examination:   Vitals:   Vitals:    06/19/19 1515   Resp: 16     General: AAO x 3 in NAD. Dermatologic Exam:  Skin lesion/ulceration Absent . Skin No rashes or nodules noted. .       Musculoskeletal:     1st MPJ ROM decreased, Bilateral.  Muscle strength 5/5, Bilateral.  Pain present upon palpation of skin lesion to bottom of his left foot and 4th digit. Medial longitudinal arch, Bilateral WNL. Ankle ROM WNL,Bilateral.    Dorsally contracted digits noted to digits 1-5 Bilateral.     Vascular: DP and PT pulses palpable 2/4, Bilateral.  CFT <3 seconds, Bilateral.  Hair growth present to the level of the digits, Bilateral.  Edema absent, Bilateral.  Varicosities absent, Bilateral. Erythema absent, Bilateral    Neurological: Sensation intact to light touch to level of digits, Bilateral.  Protective sensation intact 10/10 sites via 5.07/10g [place]-Jade Monofilament, Bilateral.  negative Tinel's, Bilateral.  negative Valleix sign, Bilateral.      Integument: Warm, dry, supple, Bilateral. Benign skin neoplasm noted to sub 5th MTH and left 4th digit with petechiae. Open lesion absent, Bilateral.  Interdigital maceration absent to web spaces 1-4, Bilateral.  Nails are normal in length, thickness and color 1-5 bilateral.  Fissures absent, Bilateral.       Asessment: Patient is a 60 y.o. male with:    Diagnosis Orders   1. Benign neoplasm of skin of left foot  20011 - [place] DESTRUCTION BENIGN LESIONS UP TO 14   2. Pain in both lower extremities  94677 - [place] DESTRUCTION BENIGN LESIONS UP TO 14   3. Difficulty walking  85750 - [place] DESTRUCTION BENIGN LESIONS UP TO 14   4.  Hammer toes of both feet  90456 - [place] DESTRUCTION BENIGN LESIONS UP TO 14         Plan: Patient examined and evaluated. Current condition and treatment options discussed in detail. The lesion was partially excised and cantharidin acid was applied under occlusion. The patient will leave in place for 24-48 hours and than remove. The patient tolerated the procedure well and without complication. Discussed conservative and surgical options with the patient. Verbal and written instructions given to patient. Contact office with any questions/problems/concerns. RTC in 2week(s).     6/19/2019    Electronically signed by Beena Kirby DPM on 6/19/2019 at 3:41 PM  6/19/2019

## 2019-07-03 ENCOUNTER — OFFICE VISIT (OUTPATIENT)
Dept: PODIATRY | Age: 59
End: 2019-07-03
Payer: COMMERCIAL

## 2019-07-03 VITALS — BODY MASS INDEX: 23.77 KG/M2 | RESPIRATION RATE: 16 BRPM | HEIGHT: 70 IN | WEIGHT: 166 LBS

## 2019-07-03 DIAGNOSIS — M20.41 HAMMER TOES OF BOTH FEET: ICD-10-CM

## 2019-07-03 DIAGNOSIS — M79.604 PAIN IN BOTH LOWER EXTREMITIES: ICD-10-CM

## 2019-07-03 DIAGNOSIS — R26.2 DIFFICULTY WALKING: ICD-10-CM

## 2019-07-03 DIAGNOSIS — D23.72 BENIGN NEOPLASM OF SKIN OF LEFT FOOT: Primary | ICD-10-CM

## 2019-07-03 DIAGNOSIS — M20.42 HAMMER TOES OF BOTH FEET: ICD-10-CM

## 2019-07-03 DIAGNOSIS — M79.605 PAIN IN BOTH LOWER EXTREMITIES: ICD-10-CM

## 2019-07-03 PROCEDURE — 99213 OFFICE O/P EST LOW 20 MIN: CPT | Performed by: PODIATRIST

## 2019-07-03 PROCEDURE — 17110 DESTRUCTION B9 LES UP TO 14: CPT | Performed by: PODIATRIST

## 2019-07-03 NOTE — PROGRESS NOTES
[facility]YSICIANS  [facility] [address]  [address] [place]   Dept: [phone]  Dept Fax: [phone]    RETURN PATIENT PROGRESS NOTE  Date of patient's visit: 7/3/2019  Patient's Name:  Adriana Veloz YOB: 1960            Patient Care Team:  Harriet Turner MD as PCP - General (Family Medicine)  Harriet Turner MD as PCP - [facility]  Sharlene Goodson DPM as Physician (Podiatry)       Santiago Abernathy 60 y.o. male that presents for follow-up of benign lesions to left foot  Chief Complaint   Patient presents with    Benign Neoplasm     Pt's primary care physician is Harriet Turner MD last seen 5/10/19  Symptoms began year(s) ago and are decreased . Patient relates pain is Present. Pain is rated 6 out of 10 and is described as intermittent. He would like to discuss surgery for hammertoe correction , left 4th digit. Treatments prior to today's visit include: partial excision and application of cantharidin. Currently denies F/C/N/V. No Known Allergies    Past Medical History:   Diagnosis Date    Osteoarthritis        Prior to Admission medications    Not on File       Review of Systems    Review of Systems:  History obtained from chart review and the patient  General ROS: negative for - chills, fatigue, fever, night sweats or weight gain  Constitutional: Negative for chills, diaphoresis, fatigue, fever and unexpected weight change. Musculoskeletal: Positive for arthralgias, gait problem and joint swelling. Neurological ROS: negative for - behavioral changes, confusion, headaches or seizures. Negative for weakness and numbness. Dermatological ROS: negative for - mole changes, rash  Cardiovascular: Negative for leg swelling. Gastrointestinal: Negative for constipation, diarrhea, nausea and vomiting. Lower Extremity Physical Examination:     Vitals:   Vitals:    07/03/19 1057   Resp: 16     General: AAO x 3 in NAD. Dermatologic Exam:  Benign skin neoplasm distal 4th  Digit and sub 4th and 5th MTH with petechiae, left foot    Musculoskeletal:     1st MPJ ROM decreased, Bilateral.  Muscle strength 5/5, Bilateral.  Pain present upon palpation of left 4th digit. Medial longitudinal arch, Bilateral WNL. Ankle ROM WNL,Bilateral.    Dorsally contracted digits noted to 4th digit, left. Vascular: DP and PT pulses palpable 2/4, Bilateral.  CFT <3 seconds, Bilateral.  Hair growth present to the level of the digits, Bilateral.  Edema absent, Bilateral.  Varicosities absent, Bilateral. Erythema absent, Bilateral    Neurological: Sensation intact to light touch to level of digits, Bilateral.  Protective sensation intact 10/10 sites via 5.07/10g [place]Jade Monofilament, Bilateral.  negative Tinel's, Bilateral.  negative Valleix sign, Bilateral.      Integument: Warm, dry, supple, Bilateral.  Open lesion absent, Bilateral.  Interdigital maceration absent to web spaces 1-4, Bilateral.  Nails are normal in length, thickness and color 1-5 bilateral.  Fissures absent, Bilateral.       Asessment: Patient is a 60 y.o. male with:    Diagnosis Orders   1. Benign neoplasm of skin of left foot  95186 - [place] DESTRUCTION BENIGN LESIONS UP TO 14   2. Pain in both lower extremities  99832 - [place] DESTRUCTION BENIGN LESIONS UP TO 14   3. Difficulty walking  79238 - [place] DESTRUCTION BENIGN LESIONS UP TO 14   4. Hammer toes of both feet  90126 - [place] DESTRUCTION BENIGN LESIONS UP TO 14       Plan: Patient examined and evaluated. Current condition and treatment options discussed in detail. Discussed hammertoe correction of left 4th digit. Advised pt to consider surgery as last treatment option. Discussed in length flexor tenotomy procedure to straighten left 4th digit. The lesion was partially excised and Pyrogallic acid was applied under occlusion. The patient will leave in place for 24-48 hours and than remove.  The patient tolerated the

## 2019-10-02 ENCOUNTER — OFFICE VISIT (OUTPATIENT)
Dept: PODIATRY | Age: 59
End: 2019-10-02
Payer: COMMERCIAL

## 2019-10-02 VITALS — WEIGHT: 166 LBS | RESPIRATION RATE: 16 BRPM | HEIGHT: 70 IN | BODY MASS INDEX: 23.77 KG/M2

## 2019-10-02 DIAGNOSIS — M79.605 PAIN IN BOTH LOWER EXTREMITIES: ICD-10-CM

## 2019-10-02 DIAGNOSIS — R26.2 DIFFICULTY WALKING: ICD-10-CM

## 2019-10-02 DIAGNOSIS — M20.42 HAMMER TOES OF BOTH FEET: ICD-10-CM

## 2019-10-02 DIAGNOSIS — D23.72 BENIGN NEOPLASM OF SKIN OF LEFT FOOT: Primary | ICD-10-CM

## 2019-10-02 DIAGNOSIS — M20.41 HAMMER TOES OF BOTH FEET: ICD-10-CM

## 2019-10-02 DIAGNOSIS — M79.604 PAIN IN BOTH LOWER EXTREMITIES: ICD-10-CM

## 2019-10-02 PROCEDURE — 99213 OFFICE O/P EST LOW 20 MIN: CPT | Performed by: PODIATRIST

## 2019-10-02 PROCEDURE — 17110 DESTRUCTION B9 LES UP TO 14: CPT | Performed by: PODIATRIST

## 2019-10-02 RX ORDER — PREDNISONE 20 MG/1
TABLET ORAL
COMMUNITY
End: 2019-11-27

## 2019-10-02 RX ORDER — DIPHENHYDRAMINE HYDROCHLORIDE 25 MG/1
TABLET ORAL
Refills: 0 | COMMUNITY
Start: 2019-09-20 | End: 2019-11-27

## 2019-10-02 RX ORDER — SULFAMETHOXAZOLE AND TRIMETHOPRIM 800; 160 MG/1; MG/1
TABLET ORAL
COMMUNITY
End: 2019-11-27

## 2019-10-02 RX ORDER — PREDNISONE 20 MG/1
TABLET ORAL
Refills: 0 | COMMUNITY
Start: 2019-09-20 | End: 2019-11-27

## 2019-10-16 ENCOUNTER — OFFICE VISIT (OUTPATIENT)
Dept: PODIATRY | Age: 59
End: 2019-10-16
Payer: COMMERCIAL

## 2019-10-16 VITALS — RESPIRATION RATE: 16 BRPM | BODY MASS INDEX: 23.77 KG/M2 | HEIGHT: 70 IN | WEIGHT: 166 LBS

## 2019-10-16 DIAGNOSIS — M20.42 HAMMER TOES OF BOTH FEET: ICD-10-CM

## 2019-10-16 DIAGNOSIS — D23.72 BENIGN NEOPLASM OF SKIN OF LEFT FOOT: Primary | ICD-10-CM

## 2019-10-16 DIAGNOSIS — M20.41 HAMMER TOES OF BOTH FEET: ICD-10-CM

## 2019-10-16 DIAGNOSIS — R26.2 DIFFICULTY WALKING: ICD-10-CM

## 2019-10-16 PROCEDURE — 99213 OFFICE O/P EST LOW 20 MIN: CPT | Performed by: PODIATRIST

## 2019-11-13 ENCOUNTER — OFFICE VISIT (OUTPATIENT)
Dept: PODIATRY | Age: 59
End: 2019-11-13
Payer: COMMERCIAL

## 2019-11-13 VITALS — HEIGHT: 69 IN | WEIGHT: 166 LBS | RESPIRATION RATE: 16 BRPM | BODY MASS INDEX: 24.59 KG/M2

## 2019-11-13 DIAGNOSIS — M20.41 HAMMER TOES OF BOTH FEET: ICD-10-CM

## 2019-11-13 DIAGNOSIS — M20.42 HAMMER TOES OF BOTH FEET: ICD-10-CM

## 2019-11-13 DIAGNOSIS — D23.72 BENIGN NEOPLASM OF SKIN OF LEFT FOOT: Primary | ICD-10-CM

## 2019-11-13 DIAGNOSIS — R26.2 DIFFICULTY WALKING: ICD-10-CM

## 2019-11-13 PROCEDURE — 99213 OFFICE O/P EST LOW 20 MIN: CPT | Performed by: PODIATRIST

## 2019-11-13 PROCEDURE — 17110 DESTRUCTION B9 LES UP TO 14: CPT | Performed by: PODIATRIST

## 2019-11-27 ENCOUNTER — OFFICE VISIT (OUTPATIENT)
Dept: PODIATRY | Age: 59
End: 2019-11-27
Payer: COMMERCIAL

## 2019-11-27 VITALS — WEIGHT: 166 LBS | HEIGHT: 69 IN | RESPIRATION RATE: 16 BRPM | BODY MASS INDEX: 24.59 KG/M2

## 2019-11-27 DIAGNOSIS — M20.41 HAMMER TOES OF BOTH FEET: ICD-10-CM

## 2019-11-27 DIAGNOSIS — D23.72 BENIGN NEOPLASM OF SKIN OF LEFT FOOT: Primary | ICD-10-CM

## 2019-11-27 DIAGNOSIS — M20.42 HAMMER TOES OF BOTH FEET: ICD-10-CM

## 2019-11-27 DIAGNOSIS — R26.2 DIFFICULTY WALKING: ICD-10-CM

## 2019-11-27 PROCEDURE — 17110 DESTRUCTION B9 LES UP TO 14: CPT | Performed by: PODIATRIST

## 2020-02-05 ENCOUNTER — OFFICE VISIT (OUTPATIENT)
Dept: PODIATRY | Age: 60
End: 2020-02-05
Payer: COMMERCIAL

## 2020-02-05 VITALS — BODY MASS INDEX: 24.59 KG/M2 | RESPIRATION RATE: 16 BRPM | WEIGHT: 166 LBS | HEIGHT: 69 IN

## 2020-02-05 PROCEDURE — 17110 DESTRUCTION B9 LES UP TO 14: CPT | Performed by: PODIATRIST

## 2020-02-05 NOTE — PROGRESS NOTES
[x] [x] [x] [x] [x] [x] [x] [x] [x] [x]  5 4 3  2 1 1 2 3 4 5                         Right                                        Left       Nails are painful 1-10 with direct palpation. Q7   []Yes  []No                Q8   [x]Yes  []No                     Q9   []Yes    []No    Assessment:  63 y.o. male with:    Diagnosis Orders   1. Benign neoplasm of skin of left foot  43180 - [place] DESTRUCTION BENIGN LESIONS UP TO 14   2. Difficulty walking  09507 - [place] DESTRUCTION BENIGN LESIONS UP TO 14   3. Hammer toes of both feet  74069 - [place] DESTRUCTION BENIGN LESIONS UP TO 14   4. Pain in both lower extremities  08178 - [place] DESTRUCTION BENIGN LESIONS UP TO 14       Plan:   Pt was evaluated and examined. Patient was given personalized discharge instructions. Diagnosis was discussed with the pt and all of their questions were answered in detail. Proper foot hygiene and care was discussed with the pt. Patient to check feet daily and contact the office with any questions/problems/concerns. Other comorbidity noted and will be managed by PCP. Pain waiver discussed with patient and confirmed. 2/5/2020    The lesion was partially debrided and silver nitrate was applied with an apperature pad under occlusion. The patient will leave in place for 24-48 hours and than remove. The patient tolerated the procedure well and without complication.    Pt will follow up in 9 weeks         Electronically signed by Nelda Houston DPM on 2/5/2020 at 3:39 PM  2/5/2020